# Patient Record
(demographics unavailable — no encounter records)

---

## 2024-12-16 NOTE — HISTORY OF PRESENT ILLNESS
[FreeTextEntry1] : Patient is a 68yo F with  HTN, HLD, family h/o CAD for cardiac follow up. Not tolerating higher dose statin with "brain fog" and GI symptoms. No CP/SOB. Patient denies PND/orthopnea/edema/palpitations/syncope/claudication . Noting some shooting type pain in her RLE shin area. Worse with exertion, no clear muscle cramping.   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   ROS: GI and  negative

## 2024-12-16 NOTE — PHYSICAL EXAM
[General Appearance - Well Developed] : well developed [General Appearance - Well Nourished] : well nourished [Normal Conjunctiva] : the conjunctiva exhibited no abnormalities [Normal Oropharynx] : normal oropharynx [Normal Jugular Venous V Waves Present] : normal jugular venous V waves present [] : no respiratory distress [Respiration, Rhythm And Depth] : normal respiratory rhythm and effort [Auscultation Breath Sounds / Voice Sounds] : lungs were clear to auscultation bilaterally [Heart Rate And Rhythm] : heart rate and rhythm were normal [Heart Sounds] : normal S1 and S2 [Murmurs] : no murmurs present [Bowel Sounds] : normal bowel sounds [Abdomen Soft] : soft [Abdomen Tenderness] : non-tender [Abnormal Walk] : normal gait [Nail Clubbing] : no clubbing of the fingernails [Cyanosis, Localized] : no localized cyanosis [Skin Color & Pigmentation] : normal skin color and pigmentation [Skin Turgor] : normal skin turgor [Oriented To Time, Place, And Person] : oriented to person, place, and time [Affect] : the affect was normal [FreeTextEntry1] : no edema. Varicose and spider veins bilaterally

## 2024-12-16 NOTE — HISTORY OF PRESENT ILLNESS
[FreeTextEntry1] : Patient is a 66yo F with  HTN, HLD, family h/o CAD for cardiac follow up. Not tolerating higher dose statin with "brain fog" and GI symptoms. No CP/SOB. Patient denies PND/orthopnea/edema/palpitations/syncope/claudication . Noting some shooting type pain in her RLE shin area. Worse with exertion, no clear muscle cramping.   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

## 2024-12-16 NOTE — DISCUSSION/SUMMARY
[EKG obtained to assist in diagnosis and management of assessed problem(s)] : EKG obtained to assist in diagnosis and management of assessed problem(s) [FreeTextEntry1] : Patient is a 68yo F with  HTN, HLD, family h/o CAD, ectopic atrial rhythm for cardiac follow up.  CAROTID STENOSIS: -Stable mild carotid disease on duplex in 6/2024, med mgmt. Repeat study 1 year  RWMA: -Hypokinesis persists on echo 7/2023 and again 6/2024 of ? significance given normal nuclear stress testing again 8/2023. No clear anginal symptoms at this time either.? prior myocarditis . Consider MRI but after discussion with patient unable to undergo due to claustrophobia, will hold off. Can use Xanax as well and offered but still refused imaging.  -Repeat echo 1 yaer   HTN/HLD: -On Edarbyclor, BP today high but lower at home in 120s/70-80s.  -LDL goal < 100, mild increase on BW 11/2023 and 7/2024. Atorvastatin increased to 40mg qhsTG elevated but was non-fasting. HAving side effects, will do trial of CoQ 10 and if no relief in 2-3 weeks cut to 20mg qhs.      1. CV stable, continue aggressive risk factor modification  2. Continue current antihypertensives, blood pressure is well controlled  3. Recommend aggressive diet and lifestyle modifications  4. Recommend 30 minutes moderate intensity aerobic activity 5 days per week  5. AS noted trial of CoQ 10 for statin side effects, if no improvement then cut back to 20mg qhs and repeat lipids in the spring. consider adding zetia instead  6. Follow up 6 months 7. LE arterial duplex to evaluate above leg symptoms. Consider neuro eval for neuropathy as well, call with results

## 2024-12-16 NOTE — ASSESSMENT
[FreeTextEntry1] : ECG: SR, septal QS, NSST   HDL 49   (7/2024)   HDL 52   (11/2023)   HDL 49 LDL 90  (11/2022)   HDL 50   (2/2020)  CMP/CBC/TSH unremarkable   EX NUCLEAR STRESS 8/2023: 1. Negative for ischemia, EF 79% 2. Achieved 10 METS   CAROTID 6/2024:1-49% ANABELL stenosis. Antegrade flow in the vertebral arteries bilaterally CAROTID 7/2023: 1-49% ANABELL stenosis. Antegrade flow in the vertebral arteries bilaterally  ECHO 6/2024: 1. Sigmoid septum, EF 55-60% 2. Hypo basal inferoseptum and inferior walls 3. Normal RV/LA/RA  4. Trivial MR, mild TR, RVSP 16mmJh 5. Unchanged from prior  ECHO 8/2023: 1. Sigmoid septum, EF 55-60% 2. Hypo basal inferoseptum and inferior walls 3. Normal RV/LA/RA  4. Mild MR 5. unchanged from prior  CAROTID 8/2021:  1. Mild atherosclerosis 2. Antegrade flow in the vertebral arteries bilaterally  3. 0.9cm complex nodule in left thyroid lobe  KATHRINE 6/2020: 1. R 1.04 2. L 1.04  ECHO 3/2020: 1. Normal LV size, systolic and diastolic function. Sigmoid septum, EF 55-60% 2. Normal RV/LA/RA  3. Mild basal inferior wall tardokinesis 4. Trace MR  EX NUCLEAR STRESS TEST 3/2020: 1. Negative for ischemia 2. EF 70% 3. achieved 10METs, 9 minutes 4. Normal HR/BP response  EX NUCLEAR STRESS TEST 2017: 1.  Negative for ischemia 2. EF 66% 3. Achieved 9 minutes, 10 METS

## 2025-06-13 NOTE — DISCUSSION/SUMMARY
[EKG obtained to assist in diagnosis and management of assessed problem(s)] : EKG obtained to assist in diagnosis and management of assessed problem(s) [FreeTextEntry1] : Patient is a 68yo F with  HTN, HLD, family h/o CAD, ectopic atrial rhythm for cardiac follow up.  CAROTID STENOSIS: -Stable mild carotid disease on duplex in 6/2024, med mgmt. Repeat study at follow up   RWMA: -Hypokinesis persists on echo 7/2023 and again 6/2024 of ? significance given normal nuclear stress testing again 8/2023. No clear anginal symptoms at this time either.? prior myocarditis . Consider MRI but after discussion with patient unable to undergo due to claustrophobia, will hold off. Can use Xanax as well and offered but still refused imaging.  -Repeat echo  in 6months   HTN/HLD: -On Edarbyclor, BP today high but remains lower at home in 120s/70-80s.  -LDL goal < 100, mild increase on BW ,  Atorvastatin increased to 40mg qhs but unable to tolerate and back to 20mg daily, will add zetia. BW was again not fasting, unclear why  .Will repeat in 1 month, if TG remain high while fasting will add Vascepa/fish oils      1. CV stable, continue aggressive risk factor modification  2. Continue current antihypertensives, blood pressure is well controlled and lower at home 3. Recommend aggressive diet and lifestyle modifications  4. Recommend 30 minutes moderate intensity aerobic activity 5 days per week  5. Check lipids in 1-2 months, add zetia 10mg and continue Atorvastatin 20mg with CoQ 10 6. Follow up 6 months with echo to evaluate RWMA and carotid for surveillance

## 2025-06-13 NOTE — HISTORY OF PRESENT ILLNESS
[FreeTextEntry1] : Patient is a 66yo F with  HTN, HLD, family h/o CAD for cardiac follow up. Not tolerating higher dose statin with "brain fog" and GI symptoms. No CP/SOB. Patient denies PND/orthopnea/edema/palpitations/syncope/claudication . Advised CoQ 10 for statin side effects but no relief and dose cut to 20mg qhs. Otherwise feels well.   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

## 2025-06-13 NOTE — PHYSICAL EXAM
[General Appearance - Well Nourished] : well nourished [General Appearance - Well Developed] : well developed [Normal Conjunctiva] : the conjunctiva exhibited no abnormalities [Normal Oropharynx] : normal oropharynx [Normal Jugular Venous V Waves Present] : normal jugular venous V waves present [] : no respiratory distress [Respiration, Rhythm And Depth] : normal respiratory rhythm and effort [Auscultation Breath Sounds / Voice Sounds] : lungs were clear to auscultation bilaterally [Heart Rate And Rhythm] : heart rate and rhythm were normal [Heart Sounds] : normal S1 and S2 [Murmurs] : no murmurs present [Bowel Sounds] : normal bowel sounds [Abdomen Soft] : soft [Abnormal Walk] : normal gait [Abdomen Tenderness] : non-tender [Nail Clubbing] : no clubbing of the fingernails [Cyanosis, Localized] : no localized cyanosis [Skin Color & Pigmentation] : normal skin color and pigmentation [Oriented To Time, Place, And Person] : oriented to person, place, and time [Skin Turgor] : normal skin turgor [Affect] : the affect was normal [FreeTextEntry1] : no edema. Varicose and spider veins bilaterally

## 2025-06-13 NOTE — ASSESSMENT
[FreeTextEntry1] : ECG: SR, borderline septal QS, NSST , low voltage   HDL 45   (6/2025)   HDL 49   (7/2024)   HDL 52   (11/2023)   HDL 49 LDL 90  (11/2022)   HDL 50   (2/2020)  CMP/CBC/TSH unremarkable   LE ARTERIAL DUPLEX 2/2025: Minimal atherosclerosis   EX NUCLEAR STRESS 8/2023: 1. Negative for ischemia, EF 79% 2. Achieved 10 METS   CAROTID 6/2024:1-49% ANABELL stenosis. Antegrade flow in the vertebral arteries bilaterally CAROTID 7/2023: 1-49% ANABELL stenosis. Antegrade flow in the vertebral arteries bilaterally  ECHO 6/2024: 1. Sigmoid septum, EF 55-60% 2. Hypo basal inferoseptum and inferior walls 3. Normal RV/LA/RA  4. Trivial MR, mild TR, RVSP 16mmJh 5. Unchanged from prior  ECHO 8/2023: 1. Sigmoid septum, EF 55-60% 2. Hypo basal inferoseptum and inferior walls 3. Normal RV/LA/RA  4. Mild MR 5. unchanged from prior  CAROTID 8/2021:  1. Mild atherosclerosis 2. Antegrade flow in the vertebral arteries bilaterally  3. 0.9cm complex nodule in left thyroid lobe  KATHRINE 6/2020: 1. R 1.04 2. L 1.04  ECHO 3/2020: 1. Normal LV size, systolic and diastolic function. Sigmoid septum, EF 55-60% 2. Normal RV/LA/RA  3. Mild basal inferior wall tardokinesis 4. Trace MR  EX NUCLEAR STRESS TEST 3/2020: 1. Negative for ischemia 2. EF 70% 3. achieved 10METs, 9 minutes 4. Normal HR/BP response  EX NUCLEAR STRESS TEST 2017: 1.  Negative for ischemia 2. EF 66% 3. Achieved 9 minutes, 10 METS